# Patient Record
Sex: MALE | Race: WHITE | NOT HISPANIC OR LATINO | Employment: FULL TIME | URBAN - METROPOLITAN AREA
[De-identification: names, ages, dates, MRNs, and addresses within clinical notes are randomized per-mention and may not be internally consistent; named-entity substitution may affect disease eponyms.]

---

## 2023-12-28 ENCOUNTER — HOSPITAL ENCOUNTER (EMERGENCY)
Facility: HOSPITAL | Age: 43
Discharge: HOME | End: 2023-12-28
Attending: EMERGENCY MEDICINE
Payer: COMMERCIAL

## 2023-12-28 VITALS
OXYGEN SATURATION: 99 % | HEIGHT: 78 IN | TEMPERATURE: 98 F | SYSTOLIC BLOOD PRESSURE: 122 MMHG | BODY MASS INDEX: 28.93 KG/M2 | RESPIRATION RATE: 18 BRPM | HEART RATE: 116 BPM | DIASTOLIC BLOOD PRESSURE: 87 MMHG | WEIGHT: 250 LBS

## 2023-12-28 DIAGNOSIS — L02.91 ABSCESS: Primary | ICD-10-CM

## 2023-12-28 PROCEDURE — 10061 I&D ABSCESS COMP/MULTIPLE: CPT | Mod: ER

## 2023-12-28 PROCEDURE — 99284 EMERGENCY DEPT VISIT MOD MDM: CPT | Mod: ER

## 2023-12-28 PROCEDURE — 25000003 PHARM REV CODE 250: Mod: ER | Performed by: PHYSICIAN ASSISTANT

## 2023-12-28 RX ORDER — SULFAMETHOXAZOLE AND TRIMETHOPRIM 800; 160 MG/1; MG/1
1 TABLET ORAL 2 TIMES DAILY
Qty: 14 TABLET | Refills: 0 | Status: SHIPPED | OUTPATIENT
Start: 2023-12-28 | End: 2024-01-04

## 2023-12-28 RX ORDER — IBUPROFEN 600 MG/1
600 TABLET ORAL EVERY 6 HOURS PRN
Qty: 30 TABLET | Refills: 0 | Status: SHIPPED | OUTPATIENT
Start: 2023-12-28

## 2023-12-28 RX ORDER — LIDOCAINE HYDROCHLORIDE 10 MG/ML
10 INJECTION, SOLUTION EPIDURAL; INFILTRATION; INTRACAUDAL; PERINEURAL
Status: COMPLETED | OUTPATIENT
Start: 2023-12-28 | End: 2023-12-28

## 2023-12-28 RX ADMIN — LIDOCAINE HYDROCHLORIDE 100 MG: 10 INJECTION, SOLUTION EPIDURAL; INFILTRATION; INTRACAUDAL at 04:12

## 2023-12-28 NOTE — ED PROVIDER NOTES
Encounter Date: 12/28/2023       History     Chief Complaint   Patient presents with    Abscess     Pt reports abscess to buttock /sacral area. Worsened on  the last few days. Denies fever at home. Pain at site.     43-year-old male presents to ED with concern of abscess that began 2 days ago with progressive worsening pain and localized swelling.  No drainage.  Denying history of abscess to same location in past.  No pain with defecation.  No fevers, chills, body aches.  No other acute complaints at this time.    The history is provided by the patient.     Review of patient's allergies indicates:  No Known Allergies  No past medical history on file.  No past surgical history on file.  No family history on file.     Review of Systems   Constitutional:  Negative for chills and fever.   Skin:  Positive for wound.       Physical Exam     Initial Vitals [12/28/23 1538]   BP Pulse Resp Temp SpO2   122/87 (!) 116 18 97.9 °F (36.6 °C) 99 %      MAP       --         Physical Exam    Nursing note and vitals reviewed.  Constitutional: Vital signs are normal. He appears well-developed and well-nourished. He is cooperative. He does not have a sickly appearance. He does not appear ill. No distress.   HENT:   Head: Normocephalic and atraumatic.   Eyes: EOM are normal.   Neck:   Normal range of motion.  Musculoskeletal:      Cervical back: Normal range of motion.     Neurological: He is alert and oriented to person, place, and time. GCS eye subscore is 4. GCS verbal subscore is 5. GCS motor subscore is 6.   Skin:        Large abscess formation to right superior gluteal cleft.  No active drainage.  Tenderness to site.  Does not appear to have rectal involvement   Psychiatric: He has a normal mood and affect. His speech is normal and behavior is normal.         ED Course   I & D - Incision and Drainage    Date/Time: 12/28/2023 5:13 PM  Location procedure was performed: [facility] EMERGENCY DEPARTMENT    Performed by: Lupillo Perea  BRADY  Authorized by: Jozef Manzo MD  Consent Done: Yes  Consent: Verbal consent obtained.  Consent given by: patient  Patient identity confirmed: name  Type: abscess  Body area: anogenital  Location details: gluteal cleft  Anesthesia: local infiltration    Anesthesia:  Local Anesthetic: lidocaine 1% without epinephrine  Scalpel size: 11  Incision type: single straight  Complexity: complex  Drainage: pus  Drainage amount: copious  Wound treatment: wound left open, deloculation and wound packed  Packing material: 1/2 in gauze  Patient tolerance: Patient tolerated the procedure well with no immediate complications        Labs Reviewed - No data to display       Imaging Results    None          Medications   LIDOcaine (PF) 10 mg/ml (1%) injection 100 mg (100 mg Infiltration Given 12/28/23 1612)     Medical Decision Making  Patient presents with concern of abscess to right buttock region that began 2 days ago.  Afebrile.  Large abscess formation noted to superior right gluteal cleft.    DDx:  Including but not limited to pilonidal cyst, abscess, cellulitis    Abscess site does not appear to have rectal involvement.  Bedside I&D performed.  Packing was placed.  Sterile dressings applied.  Prescription for Bactrim and naproxen.  Encouraged to keep area clean and dry, monitor wound healing closely with close PCP/ED follow-up for wound check and packing removal.  ED return precautions were discussed at length.  Patient states his understanding and agrees with plan.    Risk  Prescription drug management.                                      Clinical Impression:  Final diagnoses:  [L02.91] Abscess (Primary)          ED Disposition Condition    Discharge Stable          ED Prescriptions       Medication Sig Dispense Start Date End Date Auth. Provider    sulfamethoxazole-trimethoprim 800-160mg (BACTRIM DS) 800-160 mg Tab Take 1 tablet by mouth 2 (two) times daily. for 7 days 14 tablet 12/28/2023 1/4/2024 Lupillo Perea  BRADY    ibuprofen (ADVIL,MOTRIN) 600 MG tablet Take 1 tablet (600 mg total) by mouth every 6 (six) hours as needed for Pain. 30 tablet 12/28/2023 -- Lupillo Perea PA-C          Follow-up Information       Follow up With Specialties Details Why Contact Info    Your Doctor  In 2 days For wound re-check              Lupillo Perea PA-C  12/28/23 1647

## 2023-12-28 NOTE — DISCHARGE INSTRUCTIONS

## 2023-12-28 NOTE — ED TRIAGE NOTES
Reports development of abscess to sacral area of the past week that has become painful. Denies drainage or fever. Patient is diabetic. Presents in no distress.